# Patient Record
Sex: FEMALE | URBAN - METROPOLITAN AREA
[De-identification: names, ages, dates, MRNs, and addresses within clinical notes are randomized per-mention and may not be internally consistent; named-entity substitution may affect disease eponyms.]

---

## 2022-04-27 ENCOUNTER — OFFICE VISIT (OUTPATIENT)
Dept: URBAN - METROPOLITAN AREA CLINIC 12 | Facility: CLINIC | Age: 42
End: 2022-04-27
Payer: COMMERCIAL

## 2022-04-27 ENCOUNTER — WEB ENCOUNTER (OUTPATIENT)
Dept: URBAN - METROPOLITAN AREA CLINIC 12 | Facility: CLINIC | Age: 42
End: 2022-04-27

## 2022-04-27 ENCOUNTER — DASHBOARD ENCOUNTERS (OUTPATIENT)
Age: 42
End: 2022-04-27

## 2022-04-27 DIAGNOSIS — Z80.0 FAMILY HISTORY OF COLON CANCER: ICD-10-CM

## 2022-04-27 DIAGNOSIS — R14.0 BLOATING: ICD-10-CM

## 2022-04-27 PROBLEM — 60728008 BLOATING: Status: ACTIVE | Noted: 2022-04-27

## 2022-04-27 PROCEDURE — 99203 OFFICE O/P NEW LOW 30 MIN: CPT | Performed by: INTERNAL MEDICINE

## 2022-04-27 RX ORDER — SODIUM PICOSULFATE, MAGNESIUM OXIDE, AND ANHYDROUS CITRIC ACID 10; 3.5; 12 MG/160ML; G/160ML; G/160ML
160 ML LIQUID ORAL
Qty: 320 MILLILITER | Refills: 0 | OUTPATIENT
Start: 2022-04-27 | End: 2022-04-28

## 2022-04-27 NOTE — HPI-TODAY'S VISIT:
41 yo female presenting for evaluation for crc screening her father was diagnosed with colon cancer- diagnosed at 61. s/p resection, stage III.  no genetic abnormality found on specimen. was advised to start screening for his children -her pat uncle was also diagnosed with CRC as well -has a longstanding history of bloating and gas, not new.  states that often this is with dairy. she hasn't tried avoiding dairy or taking lactaid -no consistant issues with abdominal pain.  no weight loss -denies any history of heart or lung disease -denies any abdominal surgery -denies any trouble with anesthesia -etoh- on weekends only -denies any tobacco

## 2022-05-17 ENCOUNTER — OFFICE VISIT (OUTPATIENT)
Dept: URBAN - METROPOLITAN AREA SURGERY CENTER 15 | Facility: SURGERY CENTER | Age: 42
End: 2022-05-17

## 2022-07-12 ENCOUNTER — OFFICE VISIT (OUTPATIENT)
Dept: URBAN - METROPOLITAN AREA SURGERY CENTER 15 | Facility: SURGERY CENTER | Age: 42
End: 2022-07-12